# Patient Record
Sex: FEMALE | Race: BLACK OR AFRICAN AMERICAN | NOT HISPANIC OR LATINO | URBAN - METROPOLITAN AREA
[De-identification: names, ages, dates, MRNs, and addresses within clinical notes are randomized per-mention and may not be internally consistent; named-entity substitution may affect disease eponyms.]

---

## 2019-09-21 ENCOUNTER — EMERGENCY (EMERGENCY)
Facility: HOSPITAL | Age: 49
LOS: 1 days | Discharge: HOME | End: 2019-09-21
Attending: EMERGENCY MEDICINE | Admitting: EMERGENCY MEDICINE
Payer: COMMERCIAL

## 2019-09-21 VITALS
SYSTOLIC BLOOD PRESSURE: 131 MMHG | RESPIRATION RATE: 20 BRPM | TEMPERATURE: 99 F | HEART RATE: 98 BPM | OXYGEN SATURATION: 96 % | DIASTOLIC BLOOD PRESSURE: 86 MMHG

## 2019-09-21 PROCEDURE — 99283 EMERGENCY DEPT VISIT LOW MDM: CPT

## 2019-09-21 RX ORDER — ALBUTEROL 90 UG/1
2 AEROSOL, METERED ORAL
Qty: 1 | Refills: 0
Start: 2019-09-21 | End: 2019-09-25

## 2019-09-21 NOTE — ED PROVIDER NOTE - CLINICAL SUMMARY MEDICAL DECISION MAKING FREE TEXT BOX
Pt presents with one day of cough and wheezing. Given Nebs on arrival, reports complete improvement of sx. No wheezing noted on my assessment, VS noted. Offered CXR, pt declined. Doubt PNA, do not suspect PE or cardiac cause. Will discharge with Albuterol, Prednisone and outpatient f/u.

## 2019-09-21 NOTE — ED PROVIDER NOTE - PATIENT PORTAL LINK FT
You can access the FollowMyHealth Patient Portal offered by [facility] by registering at the following website: [email or web address]. By joining [employer]’s FollowMyHealth portal, you will also be able to view your health information using other applications (apps) compatible with our system.

## 2019-09-21 NOTE — ED PROVIDER NOTE - NSFOLLOWUPINSTRUCTIONS_ED_ALL_ED_FT

## 2019-09-21 NOTE — ED PROVIDER NOTE - OBJECTIVE STATEMENT
50 y/o F with PMHx of seasonal allergies presents with one day of cough and "wheezing". Pt notes cough to be dry, nonproductive, with associated posttussive emesis x 1. Denies fevers, chills, CP, LE edema, calf pain or hx of PE.

## 2019-09-21 NOTE — ED ADULT TRIAGE NOTE - CHIEF COMPLAINT QUOTE
Pt complaining of trouble breathing non productive cough x 1 day. Pt states "I feel like I am wheezing. I took allergy medicine and proair and had relief for a short time."  PT speaking in full sentences. Pt denies fever and chills.

## 2019-09-26 DIAGNOSIS — R06.2 WHEEZING: ICD-10-CM

## 2019-09-26 DIAGNOSIS — R05 COUGH: ICD-10-CM

## 2019-09-26 DIAGNOSIS — R11.10 VOMITING, UNSPECIFIED: ICD-10-CM

## 2019-09-26 DIAGNOSIS — R06.09 OTHER FORMS OF DYSPNEA: ICD-10-CM

## 2020-06-01 NOTE — ED ADULT NURSE NOTE - CHPI ED NUR CONTEXT2
2 RN skin check completed with Porfirio ORDONEZ    Devices in place:  · Cardiac leads  · Pulse ox  · BP cuff  · SCDs  · PIVs  · Picc line  · Irby catheter  · EEG monitor  · ET tube  · Cortrak  · Wrist restraints    Assessment   · Assessment of head prior to eeg placement. Hardware poking out of right frontal/parietal region. Scabbed indents noted.   · Ears are intact and blacnhing  · Lips are dry and abrasions noted, moisturizer applied and oral care  · Left chest healing abrasions  · Left hip bruising and and scabbing/abrasions   · Scattered bruising and abrasions noted to Bilateral UE  · Left wrist abrasion  · Scattered abrasions and bruising noted to Bilateral LE.  · Sacral region and perineal area is red but blanching  · Extremities are very dry and flaky, moisturizer applied   · Bilateral toes/toenails are dirty, attempted to clean with soap and water  · Heels are calloused       Interventions in place:  · q2h turns  · Pillows used to float elbows and heels  · Low air loss mattress  · Preventative mepilex on sacral region  · Preventative mepilex on heels   · Assessment under all medical devices listed above  · Moisturizer applied to lips and extremities  · Barrier cream applied to sacral and perineal area   unknown

## 2020-10-02 ENCOUNTER — EMERGENCY (EMERGENCY)
Facility: HOSPITAL | Age: 50
LOS: 1 days | Discharge: HOME | End: 2020-10-02
Admitting: EMERGENCY MEDICINE
Payer: COMMERCIAL

## 2020-10-02 VITALS
HEIGHT: 65 IN | TEMPERATURE: 99 F | HEART RATE: 84 BPM | DIASTOLIC BLOOD PRESSURE: 81 MMHG | OXYGEN SATURATION: 98 % | SYSTOLIC BLOOD PRESSURE: 126 MMHG | RESPIRATION RATE: 14 BRPM | WEIGHT: 154.98 LBS

## 2020-10-02 DIAGNOSIS — Z20.828 CONTACT WITH AND (SUSPECTED) EXPOSURE TO OTHER VIRAL COMMUNICABLE DISEASES: ICD-10-CM

## 2020-10-02 PROCEDURE — 99283 EMERGENCY DEPT VISIT LOW MDM: CPT

## 2020-10-02 NOTE — ED PROVIDER NOTE - PATIENT PORTAL LINK FT
You can access the FollowMyHealth Patient Portal offered by [facility] by registering at the following website: [email or web address]. By joining [employer]’s FollowMyHealth portal, you will also be able to view your health information using other applications (apps) compatible with our system.

## 2020-10-02 NOTE — ED PROVIDER NOTE - CLINICAL SUMMARY MEDICAL DECISION MAKING FREE TEXT BOX
Alicja Arana is a 50 year old female w/ PMH mild asthma who presents asymptomatic, requesting COVID screening test, after a distant COVID-19 potential exposure last week. Offers no complaints. Vitals WNL, exam is unremarkable. COVID swab was ordered and obtained, sent to lab. Pt elects to be notified of results via text. Correct number verified. She was given  opportunity to ask questions and is in understanding and agreement with plan. Given instructions on self-quarantine and conditions with which pt should seek emergency evaluation.

## 2020-10-02 NOTE — ED PROVIDER NOTE - OBJECTIVE STATEMENT
Patient is a 50 year old female, PMH mild asthma (uses albuterol rarely prn), who presents to the ED requesting a COVID -19 test. Patient states she has no symptoms, denies nausea, vomiting, fever, chills, cough, SOB diarrhea, fatigue, travel. She is seeking a COVID-19 test because her friends mother has tested positive for COVID, and she encountered said friend in close proximity 1 week ago. She states her friend is asymptomatic, and is also being tested.

## 2020-10-03 PROBLEM — J30.2 OTHER SEASONAL ALLERGIC RHINITIS: Chronic | Status: ACTIVE | Noted: 2019-09-21

## 2020-10-03 LAB — SARS-COV-2 RNA SPEC QL NAA+PROBE: SIGNIFICANT CHANGE UP

## 2021-03-16 PROBLEM — J45.20 MILD INTERMITTENT ASTHMA, UNCOMPLICATED: Chronic | Status: ACTIVE | Noted: 2020-10-02

## 2021-03-26 ENCOUNTER — APPOINTMENT (OUTPATIENT)
Age: 51
End: 2021-03-26
Payer: COMMERCIAL

## 2021-03-26 PROCEDURE — 0012A: CPT
